# Patient Record
Sex: MALE | Race: WHITE | NOT HISPANIC OR LATINO | URBAN - NONMETROPOLITAN AREA
[De-identification: names, ages, dates, MRNs, and addresses within clinical notes are randomized per-mention and may not be internally consistent; named-entity substitution may affect disease eponyms.]

---

## 2023-05-26 ENCOUNTER — APPOINTMENT (OUTPATIENT)
Dept: PRIMARY CARE | Facility: CLINIC | Age: 23
End: 2023-05-26
Payer: COMMERCIAL

## 2023-05-31 ENCOUNTER — OFFICE VISIT (OUTPATIENT)
Dept: PRIMARY CARE | Facility: CLINIC | Age: 23
End: 2023-05-31
Payer: COMMERCIAL

## 2023-05-31 VITALS
DIASTOLIC BLOOD PRESSURE: 66 MMHG | HEART RATE: 72 BPM | WEIGHT: 136 LBS | BODY MASS INDEX: 20.61 KG/M2 | SYSTOLIC BLOOD PRESSURE: 124 MMHG | OXYGEN SATURATION: 99 % | HEIGHT: 68 IN

## 2023-05-31 DIAGNOSIS — Z51.81 ENCOUNTER FOR MONITORING ANTICONVULSANT THERAPY: ICD-10-CM

## 2023-05-31 DIAGNOSIS — R56.9 POLYMICROGYRIA WITH SEIZURES (MULTI): Primary | ICD-10-CM

## 2023-05-31 DIAGNOSIS — Z79.899 ENCOUNTER FOR MONITORING ANTICONVULSANT THERAPY: ICD-10-CM

## 2023-05-31 DIAGNOSIS — F41.1 GENERALIZED ANXIETY DISORDER: ICD-10-CM

## 2023-05-31 DIAGNOSIS — Q04.3 POLYMICROGYRIA WITH SEIZURES (MULTI): Primary | ICD-10-CM

## 2023-05-31 DIAGNOSIS — F33.1 MDD (MAJOR DEPRESSIVE DISORDER), RECURRENT EPISODE, MODERATE (MULTI): ICD-10-CM

## 2023-05-31 PROBLEM — R07.81 RIB PAIN ON LEFT SIDE: Status: RESOLVED | Noted: 2023-05-31 | Resolved: 2023-05-31

## 2023-05-31 PROBLEM — B35.6 TINEA CRURIS: Status: RESOLVED | Noted: 2023-05-31 | Resolved: 2023-05-31

## 2023-05-31 PROBLEM — R53.81 MALAISE AND FATIGUE: Status: RESOLVED | Noted: 2023-05-31 | Resolved: 2023-05-31

## 2023-05-31 PROBLEM — D72.819 LEUKOPENIA: Status: ACTIVE | Noted: 2023-05-31

## 2023-05-31 PROBLEM — R22.0 HEAD LUMP: Status: RESOLVED | Noted: 2023-05-31 | Resolved: 2023-05-31

## 2023-05-31 PROBLEM — I86.1 LEFT VARICOCELE: Status: ACTIVE | Noted: 2023-05-31

## 2023-05-31 PROBLEM — G40.909 SEIZURE DISORDER (MULTI): Status: RESOLVED | Noted: 2023-05-31 | Resolved: 2023-05-31

## 2023-05-31 PROBLEM — M25.50 POLYARTHRALGIA: Status: RESOLVED | Noted: 2023-05-31 | Resolved: 2023-05-31

## 2023-05-31 PROBLEM — M99.08 SEGMENTAL AND SOMATIC DYSFUNCTION OF RIB CAGE: Status: RESOLVED | Noted: 2023-05-31 | Resolved: 2023-05-31

## 2023-05-31 PROBLEM — R10.13 ACUTE EPIGASTRIC PAIN: Status: RESOLVED | Noted: 2023-05-31 | Resolved: 2023-05-31

## 2023-05-31 PROBLEM — M94.0 COSTOCHONDRITIS: Status: RESOLVED | Noted: 2023-05-31 | Resolved: 2023-05-31

## 2023-05-31 PROBLEM — R63.4 WEIGHT LOSS: Status: RESOLVED | Noted: 2023-05-31 | Resolved: 2023-05-31

## 2023-05-31 PROBLEM — D36.7 DERMOID CYST OF LEFT UPPER EXTREMITY: Status: ACTIVE | Noted: 2023-05-31

## 2023-05-31 PROBLEM — M77.8 RIGHT WRIST TENDINITIS: Status: RESOLVED | Noted: 2023-05-31 | Resolved: 2023-05-31

## 2023-05-31 PROBLEM — D48.5 NEOPLASM OF UNCERTAIN BEHAVIOR OF SKIN OF UPPER ARM: Status: RESOLVED | Noted: 2023-05-31 | Resolved: 2023-05-31

## 2023-05-31 PROBLEM — L72.3 SEBACEOUS CYST: Status: RESOLVED | Noted: 2023-05-31 | Resolved: 2023-05-31

## 2023-05-31 PROBLEM — G40.309 GENERALIZED EPILEPSY (MULTI): Status: ACTIVE | Noted: 2023-05-31

## 2023-05-31 PROBLEM — R53.83 MALAISE AND FATIGUE: Status: RESOLVED | Noted: 2023-05-31 | Resolved: 2023-05-31

## 2023-05-31 PROBLEM — R42 DIZZINESS: Status: RESOLVED | Noted: 2023-05-31 | Resolved: 2023-05-31

## 2023-05-31 PROBLEM — R51.9 HEADACHE: Status: RESOLVED | Noted: 2023-05-31 | Resolved: 2023-05-31

## 2023-05-31 PROBLEM — M95.2 SKULL DEFORMITY: Status: RESOLVED | Noted: 2023-05-31 | Resolved: 2023-05-31

## 2023-05-31 PROBLEM — H53.2 DIPLOPIA: Status: RESOLVED | Noted: 2023-05-31 | Resolved: 2023-05-31

## 2023-05-31 PROBLEM — F84.5 ASPERGER'S DISORDER (HHS-HCC): Status: ACTIVE | Noted: 2023-05-31

## 2023-05-31 LAB
ALANINE AMINOTRANSFERASE (SGPT) (U/L) IN SER/PLAS: 19 U/L (ref 10–52)
ALBUMIN (G/DL) IN SER/PLAS: 4.3 G/DL (ref 3.4–5)
ALKALINE PHOSPHATASE (U/L) IN SER/PLAS: 58 U/L (ref 33–120)
ANION GAP IN SER/PLAS: 13 MMOL/L (ref 10–20)
ASPARTATE AMINOTRANSFERASE (SGOT) (U/L) IN SER/PLAS: 18 U/L (ref 9–39)
BILIRUBIN TOTAL (MG/DL) IN SER/PLAS: 0.5 MG/DL (ref 0–1.2)
CALCIUM (MG/DL) IN SER/PLAS: 9.1 MG/DL (ref 8.6–10.3)
CARBON DIOXIDE, TOTAL (MMOL/L) IN SER/PLAS: 28 MMOL/L (ref 21–32)
CHLORIDE (MMOL/L) IN SER/PLAS: 103 MMOL/L (ref 98–107)
CREATININE (MG/DL) IN SER/PLAS: 0.89 MG/DL (ref 0.5–1.3)
ERYTHROCYTE DISTRIBUTION WIDTH (RATIO) BY AUTOMATED COUNT: 12 % (ref 11.5–14.5)
ERYTHROCYTE MEAN CORPUSCULAR HEMOGLOBIN CONCENTRATION (G/DL) BY AUTOMATED: 31.4 G/DL (ref 32–36)
ERYTHROCYTE MEAN CORPUSCULAR VOLUME (FL) BY AUTOMATED COUNT: 98 FL (ref 80–100)
ERYTHROCYTES (10*6/UL) IN BLOOD BY AUTOMATED COUNT: 4.66 X10E12/L (ref 4.5–5.9)
GFR MALE: >90 ML/MIN/1.73M2
GLUCOSE (MG/DL) IN SER/PLAS: 75 MG/DL (ref 74–99)
HEMATOCRIT (%) IN BLOOD BY AUTOMATED COUNT: 45.8 % (ref 41–52)
HEMOGLOBIN (G/DL) IN BLOOD: 14.4 G/DL (ref 13.5–17.5)
LEUKOCYTES (10*3/UL) IN BLOOD BY AUTOMATED COUNT: 4.7 X10E9/L (ref 4.4–11.3)
PLATELETS (10*3/UL) IN BLOOD AUTOMATED COUNT: 195 X10E9/L (ref 150–450)
POTASSIUM (MMOL/L) IN SER/PLAS: 3.7 MMOL/L (ref 3.5–5.3)
PROTEIN TOTAL: 6.3 G/DL (ref 6.4–8.2)
SODIUM (MMOL/L) IN SER/PLAS: 140 MMOL/L (ref 136–145)
UREA NITROGEN (MG/DL) IN SER/PLAS: 14 MG/DL (ref 6–23)

## 2023-05-31 PROCEDURE — 85027 COMPLETE CBC AUTOMATED: CPT

## 2023-05-31 PROCEDURE — 1036F TOBACCO NON-USER: CPT | Performed by: FAMILY MEDICINE

## 2023-05-31 PROCEDURE — 80053 COMPREHEN METABOLIC PANEL: CPT

## 2023-05-31 PROCEDURE — 99214 OFFICE O/P EST MOD 30 MIN: CPT | Performed by: FAMILY MEDICINE

## 2023-05-31 RX ORDER — SERTRALINE HYDROCHLORIDE 50 MG/1
50 TABLET, FILM COATED ORAL DAILY
Qty: 30 TABLET | Refills: 5 | Status: SHIPPED | OUTPATIENT
Start: 2023-05-31 | End: 2023-06-30 | Stop reason: SINTOL

## 2023-05-31 RX ORDER — OXCARBAZEPINE 600 MG/1
600 TABLET, FILM COATED ORAL 2 TIMES DAILY
COMMUNITY
Start: 2014-12-02 | End: 2024-05-23

## 2023-05-31 NOTE — PROGRESS NOTES
Subjective   Patient ID: Flakito Hamm is a 22 y.o. male who presents for Follow-up (Having some anxiety and anger issues, starting to get worse ).  HPI  Only on trileptal right now    Having a lot of anxiety and anger issues  Fiancee issues has put him into a dark spot  Some SI- no plan  No HI  Some hopeless, worthless  + depressed  Energy level is fine  Appetite- was poor but improving  Sleeping-okay  Concentration not good  Some apathy  No maverick      Current Outpatient Medications:     OXcarbazepine (Trileptal) 600 mg tablet, Take 1 tablet (600 mg) by mouth 2 times a day., Disp: , Rfl:     sertraline (Zoloft) 50 mg tablet, Take 1 tablet (50 mg) by mouth once daily., Disp: 30 tablet, Rfl: 5   History reviewed. No pertinent surgical history.   Past Medical History:   Diagnosis Date    Acute maxillary sinusitis, unspecified 04/28/2016    Acute maxillary sinusitis    Cyanosis     Cyanotic episode    Dizziness 05/31/2023    Head lump 05/31/2023    Headache 05/31/2023    Malaise and fatigue 05/31/2023    Neoplasm of uncertain behavior of skin of upper arm 05/31/2023    Other acute sinusitis 04/19/2019    Other acute sinusitis, recurrence not specified    Other amnesia     Memory difficulties    Other conditions influencing health status     Staring spell    Other specified symptoms and signs involving the circulatory and respiratory systems     Gurgling breath sounds    Personal history of other diseases of the respiratory system 10/26/2017    History of acute sinusitis    Personal history of other specified conditions     History of fatigue    Sebaceous cyst 05/31/2023    Segmental and somatic dysfunction of rib cage 05/31/2023    Seizure disorder (CMS/HCC) 05/31/2023    Skull deformity 05/31/2023    Specific developmental disorder of motor function     Gross motor development delay    Tinea cruris 05/31/2023    Tremor, unspecified     Shaking    Unspecified coma (CMS/HCC)     Unconscious    Unspecified convulsions  "(CMS/HCC) 03/24/2013    Convulsions    Weight loss 05/31/2023     Social History     Tobacco Use    Smoking status: Never    Smokeless tobacco: Never   Vaping Use    Vaping status: Every Day     Substances: Nicotine, Flavoring     Devices: Disposable     Passive vaping exposure: Yes   Substance Use Topics    Drug use: Never      No family history on file.   Review of Systems    Objective   /66   Pulse 72   Ht 1.727 m (5' 8\")   Wt 61.7 kg (136 lb)   SpO2 99%   BMI 20.68 kg/m²    Physical Exam  Vitals and nursing note reviewed.   Constitutional:       Appearance: Normal appearance.   HENT:      Head: Normocephalic and atraumatic.   Cardiovascular:      Rate and Rhythm: Normal rate and regular rhythm.      Pulses: Normal pulses.      Heart sounds: Normal heart sounds.   Pulmonary:      Effort: Pulmonary effort is normal.      Breath sounds: Normal breath sounds.   Musculoskeletal:      Cervical back: Normal range of motion and neck supple.   Skin:     Capillary Refill: Capillary refill takes less than 2 seconds.   Neurological:      Mental Status: He is alert.   Psychiatric:         Mood and Affect: Mood is anxious. Affect is flat.         Behavior: Behavior normal.         Assessment/Plan   Problem List Items Addressed This Visit       Generalized anxiety disorder    Relevant Medications    sertraline (Zoloft) 50 mg tablet    Other Relevant Orders    Follow Up In Primary Care    MDD (major depressive disorder), recurrent episode, moderate (CMS/HCC)    Relevant Medications    sertraline (Zoloft) 50 mg tablet    Other Relevant Orders    Follow Up In Primary Care    Polymicrogyria with seizures (CMS/HCC) - Primary     Other Visit Diagnoses       Encounter for monitoring anticonvulsant therapy        Relevant Orders    CBC (Completed)    Comprehensive Metabolic Panel        MDD/SUNI- continue zoloft, avoid caffeine, consider counseling    Seizure DO- Follow with neurology, miki labs, continue " trileptal    Patient understands and agrees with treatment plan    Joon Lai, DO

## 2023-06-30 ENCOUNTER — OFFICE VISIT (OUTPATIENT)
Dept: PRIMARY CARE | Facility: CLINIC | Age: 23
End: 2023-06-30
Payer: COMMERCIAL

## 2023-06-30 VITALS
HEART RATE: 82 BPM | BODY MASS INDEX: 20.14 KG/M2 | DIASTOLIC BLOOD PRESSURE: 66 MMHG | SYSTOLIC BLOOD PRESSURE: 124 MMHG | HEIGHT: 69 IN | WEIGHT: 136 LBS | OXYGEN SATURATION: 98 %

## 2023-06-30 DIAGNOSIS — F33.1 MDD (MAJOR DEPRESSIVE DISORDER), RECURRENT EPISODE, MODERATE (MULTI): ICD-10-CM

## 2023-06-30 DIAGNOSIS — F84.5 ASPERGER'S DISORDER (HHS-HCC): ICD-10-CM

## 2023-06-30 DIAGNOSIS — F41.1 GENERALIZED ANXIETY DISORDER: ICD-10-CM

## 2023-06-30 PROCEDURE — 99213 OFFICE O/P EST LOW 20 MIN: CPT | Performed by: FAMILY MEDICINE

## 2023-06-30 PROCEDURE — 1036F TOBACCO NON-USER: CPT | Performed by: FAMILY MEDICINE

## 2023-06-30 NOTE — PROGRESS NOTES
Subjective   Patient ID: Flakito Hamm is a 22 y.o. male who presents for Follow-up (Follow up on the Zoloft ).  HPI  Zoloft was working well but got ED- libido dropped    Anxiety and anger was better  No HI/SI  No hopeless, worthless  Not depressed  Energy level is fine  Appetite- improved  Sleeping-okay  Concentration better  No apathy  No maverick    No CP, SOB, palpitations, numbness, weakness, n/v, diarrhea    Current Outpatient Medications:     OXcarbazepine (Trileptal) 600 mg tablet, Take 1 tablet (600 mg) by mouth 2 times a day., Disp: , Rfl:     vortioxetine (Trintellix) 5 mg tablet tablet, Take 1 tablet (5 mg) by mouth once daily., Disp: 30 tablet, Rfl: 3   History reviewed. No pertinent surgical history.   Past Medical History:   Diagnosis Date    Acute maxillary sinusitis, unspecified 04/28/2016    Acute maxillary sinusitis    Cyanosis     Cyanotic episode    Dizziness 05/31/2023    Head lump 05/31/2023    Headache 05/31/2023    Malaise and fatigue 05/31/2023    Neoplasm of uncertain behavior of skin of upper arm 05/31/2023    Other acute sinusitis 04/19/2019    Other acute sinusitis, recurrence not specified    Other amnesia     Memory difficulties    Other conditions influencing health status     Staring spell    Other specified symptoms and signs involving the circulatory and respiratory systems     Gurgling breath sounds    Personal history of other diseases of the respiratory system 10/26/2017    History of acute sinusitis    Personal history of other specified conditions     History of fatigue    Sebaceous cyst 05/31/2023    Segmental and somatic dysfunction of rib cage 05/31/2023    Seizure disorder (CMS/HCC) 05/31/2023    Skull deformity 05/31/2023    Specific developmental disorder of motor function     Gross motor development delay    Tinea cruris 05/31/2023    Tremor, unspecified     Shaking    Unspecified coma (CMS/HCC)     Unconscious    Unspecified convulsions (CMS/HCC) 03/24/2013     "Convulsions    Weight loss 05/31/2023     Social History     Tobacco Use    Smoking status: Never    Smokeless tobacco: Never   Vaping Use    Vaping Use: Every day    Substances: Nicotine, Flavoring    Devices: Disposable   Substance Use Topics    Drug use: Never      No family history on file.   Review of Systems    Objective   /66   Pulse 82   Ht 1.753 m (5' 9\")   Wt 61.7 kg (136 lb)   SpO2 98%   BMI 20.08 kg/m²    Physical Exam    Assessment/Plan   Problem List Items Addressed This Visit       Asperger's disorder    Relevant Medications    vortioxetine (Trintellix) 5 mg tablet tablet    Generalized anxiety disorder    Relevant Medications    vortioxetine (Trintellix) 5 mg tablet tablet    MDD (major depressive disorder), recurrent episode, moderate (CMS/HCC)    Relevant Medications    vortioxetine (Trintellix) 5 mg tablet tablet   Discontinue zoloft due to ED  Try Trintellix  CV exercise  F/U 1 month     Patient understands and agrees with treatment plan    Joon Lai, DO   "

## 2024-05-22 DIAGNOSIS — G40.409 OTHER GENERALIZED EPILEPSY AND EPILEPTIC SYNDROMES, NOT INTRACTABLE, WITHOUT STATUS EPILEPTICUS (MULTI): Primary | ICD-10-CM

## 2024-05-23 RX ORDER — OXCARBAZEPINE 600 MG/1
600 TABLET, FILM COATED ORAL 2 TIMES DAILY
Qty: 60 TABLET | Refills: 0 | Status: SHIPPED | OUTPATIENT
Start: 2024-05-23 | End: 2024-05-29 | Stop reason: SDUPTHER

## 2024-05-29 ENCOUNTER — OFFICE VISIT (OUTPATIENT)
Dept: NEUROLOGY | Facility: CLINIC | Age: 24
End: 2024-05-29
Payer: COMMERCIAL

## 2024-05-29 VITALS
HEIGHT: 69 IN | SYSTOLIC BLOOD PRESSURE: 128 MMHG | DIASTOLIC BLOOD PRESSURE: 62 MMHG | HEART RATE: 54 BPM | BODY MASS INDEX: 19.85 KG/M2 | WEIGHT: 134 LBS

## 2024-05-29 DIAGNOSIS — G40.409 OTHER GENERALIZED EPILEPSY AND EPILEPTIC SYNDROMES, NOT INTRACTABLE, WITHOUT STATUS EPILEPTICUS (MULTI): ICD-10-CM

## 2024-05-29 PROCEDURE — 1036F TOBACCO NON-USER: CPT | Performed by: PSYCHIATRY & NEUROLOGY

## 2024-05-29 PROCEDURE — 99213 OFFICE O/P EST LOW 20 MIN: CPT | Performed by: PSYCHIATRY & NEUROLOGY

## 2024-05-29 RX ORDER — OXCARBAZEPINE 600 MG/1
600 TABLET, FILM COATED ORAL 2 TIMES DAILY
Qty: 60 TABLET | Refills: 11 | Status: SHIPPED | OUTPATIENT
Start: 2024-05-29

## 2024-05-29 ASSESSMENT — ENCOUNTER SYMPTOMS
DYSURIA: 0
DYSPHORIC MOOD: 0
NERVOUS/ANXIOUS: 0
WHEEZING: 0
MYALGIAS: 0
RHINORRHEA: 0
SHORTNESS OF BREATH: 0
FEVER: 0
WOUND: 0
PALPITATIONS: 0
FREQUENCY: 0
SEIZURES: 1
CHILLS: 0
SINUS PRESSURE: 0
NAUSEA: 0
CONSTIPATION: 0
DIARRHEA: 0
LIGHT-HEADEDNESS: 0
DIZZINESS: 0
VOMITING: 0
ARTHRALGIAS: 0
SINUS PAIN: 0
COUGH: 0

## 2024-05-29 NOTE — PATIENT INSTRUCTIONS
We will see you back in 1 year for seizures if everything continues to go well.     [facility]:    [address] [facility]  [address] [place] [id number]  Phone: [phone]

## 2024-05-29 NOTE — PROGRESS NOTES
"Subjective   Patient ID: Flakito Hamm is a 23 y.o. male with past medical history of who presents for Seizures.    Patient here today in follow up for seizures.  No seizures since last visit 1 year ago.  Has been stable on current dosage of Trileptal for past several years and remained seizure-free.  Denies adverse effects from this medication.     Seizures   Pertinent negatives include no chest pain, no cough, no nausea, no vomiting and no diarrhea.       Review of Systems   Constitutional:  Negative for chills and fever.   HENT:  Negative for congestion, rhinorrhea, sinus pressure and sinus pain.    Respiratory:  Negative for cough, shortness of breath and wheezing.    Cardiovascular:  Negative for chest pain and palpitations.   Gastrointestinal:  Negative for constipation, diarrhea, nausea and vomiting.   Genitourinary:  Negative for dysuria, frequency and urgency.   Musculoskeletal:  Negative for arthralgias and myalgias.   Skin:  Negative for pallor, rash and wound.   Neurological:  Positive for seizures. Negative for dizziness, syncope and light-headedness.   Psychiatric/Behavioral:  Negative for dysphoric mood. The patient is not nervous/anxious.        Objective     Visit Vitals  /62 (BP Location: Left arm, Patient Position: Sitting, BP Cuff Size: Adult)   Pulse 54   Ht 1.753 m (5' 9\")   Wt 60.8 kg (134 lb)   BMI 19.79 kg/m²   Smoking Status Never   BSA 1.72 m²         Physical Exam  Constitutional:       Appearance: Normal appearance.   HENT:      Head: Normocephalic and atraumatic.      Right Ear: External ear normal.      Left Ear: External ear normal.      Nose: Nose normal.      Mouth/Throat:      Mouth: Mucous membranes are moist.      Pharynx: Oropharynx is clear.   Cardiovascular:      Pulses: Normal pulses.   Pulmonary:      Effort: Pulmonary effort is normal.   Abdominal:      General: Abdomen is flat.   Neurological:      Mental Status: He is alert and oriented to person, place, and time. "      Cranial Nerves: Cranial nerves 2-12 are intact.      Gait: Gait is intact.   Psychiatric:         Mood and Affect: Mood normal.         Speech: Speech normal.         Behavior: Behavior normal.       Neurologic Exam     Mental Status   Oriented to person, place, and time.   Speech: speech is normal     Cranial Nerves   Cranial nerves II through XII intact.     Motor Exam   Muscle bulk: normal  Overall muscle tone: normal    Gait, Coordination, and Reflexes     Gait  Gait: normal    Tremor   Resting tremor: absent  Intention tremor: absent  Action tremor: absent        Assessment/Plan   Problem List Items Addressed This Visit    None  Visit Diagnoses       Other generalized epilepsy and epileptic syndromes, not intractable, without status epilepticus (Multi)        Relevant Medications    OXcarbazepine (Trileptal) 600 mg tablet    Other Relevant Orders    CBC    Comprehensive Metabolic Panel        Stable on current anti-seizure medication, will continue same.  CBC and CMP to be done for surveillance while on this medication.      Follow up with neurology in 1 year, sooner if any issues.     Patient seen and discussed with attending physician, Dr Joey Nunez, DO PGY3  Family Medicine

## 2024-11-22 ENCOUNTER — OFFICE VISIT (OUTPATIENT)
Dept: DERMATOLOGY | Facility: CLINIC | Age: 24
End: 2024-11-22
Payer: COMMERCIAL

## 2024-11-22 DIAGNOSIS — D18.01 ANGIOMA OF SKIN: ICD-10-CM

## 2024-11-22 DIAGNOSIS — L90.5 SCAR CONDITIONS AND FIBROSIS OF SKIN: Primary | ICD-10-CM

## 2024-11-22 DIAGNOSIS — L57.9 SKIN CHANGES DUE TO CHRONIC EXPOSURE TO NONIONIZING RADIATION: ICD-10-CM

## 2024-11-22 DIAGNOSIS — D22.9 BENIGN NEVUS: ICD-10-CM

## 2024-11-22 DIAGNOSIS — B07.8 COMMON WART: ICD-10-CM

## 2024-11-22 DIAGNOSIS — L81.4 LENTIGO: ICD-10-CM

## 2024-11-22 PROCEDURE — 99213 OFFICE O/P EST LOW 20 MIN: CPT | Performed by: NURSE PRACTITIONER

## 2024-11-22 PROCEDURE — 1036F TOBACCO NON-USER: CPT | Performed by: NURSE PRACTITIONER

## 2024-11-22 NOTE — PROGRESS NOTES
Subjective     Flakito Hamm is a 24 y.o. male who presents for the following: Suspicious Skin Lesion.     Review of Systems:  No other skin or systemic complaints other than what is documented elsewhere in the note.    The following portions of the chart were reviewed this encounter and updated as appropriate:   Tobacco  Allergies  Meds  Problems  Med Hx  Surg Hx         Skin Cancer History  No skin cancer on file.      Specialty Problems    None       Objective   Well appearing patient in no apparent distress; mood and affect are within normal limits.    A full examination was performed including scalp, head, eyes, ears, nose, lips, neck, chest, axillae, abdomen, back, buttocks, bilateral upper extremities, bilateral lower extremities, hands, feet, fingers, toes, fingernails, and toenails. All findings within normal limits unless otherwise noted below.      Assessment/Plan   1. Scar conditions and fibrosis of skin  Left Shoulder - Anterior  Well healed scar at the site(s) of prior treatment with no evidence of recurrence.          The scar is clear, there is no evidence of recurrence.  The present appearance of the scar is not worrisome but it should continue to be observed and testing/treatment may be warranted if change occurs.      Related Procedures  Follow Up In Dermatology - Established Patient    2. Angioma of skin  Mid Back  Scattered cherry-red papule(s).    A cherry hemangioma is a small macule (small, flat, smooth area) or papule (small, solid bump) formed from an overgrowth of tiny blood vessels in the skin. Cherry hemangiomas are characteristically red or purplish in color. They often first appear in middle adulthood and usually increase in number with age. Cherry hemangiomas are noncancerous (benign) and are common in adults.    The present appearance of the lesion is not worrisome but it should continue to be observed and testing/treatment may be warranted if change occurs.    Related  Procedures  Follow Up In Dermatology - Established Patient    3. Benign nevus  Scattered, uniform and benign-appearing, regular brown melanocytic papules and macules.    The present appearance of the lesion is not worrisome but it should continue to be observed and testing/treatment may be warranted if change occurs.    Related Procedures  Follow Up In Dermatology - Established Patient    4. Lentigo  Scattered tan macules in sun-exposed areas.    A solar lentigo (plural, solar lentigines), also known as a sun-induced freckle or senile lentigo, is a dark (hyperpigmented) lesion caused by natural or artificial ultraviolet (UV) light. Solar lentigines may be single or multiple. This type of lentigo is different from a simple lentigo (lentigo simplex) because it is caused by exposure to UV light. Solar lentigines are benign, but they do indicate excessive sun exposure, a risk factor for the development of skin cancer.    To prevent solar lentigines, avoid exposure to sunlight in midday (10 AM to 3 PM), wear sun-protective clothing (tightly woven clothes and hats), and apply sunscreen (SPF 30 UVA and UVB block).    The present appearance of the lesion is not worrisome but it should continue to be observed and testing/treatment may be warranted if change occurs.    Related Procedures  Follow Up In Dermatology - Established Patient    5. Skin changes due to chronic exposure to nonionizing radiation  Actinic changes in the form of freckles, lentigines and hyper/hypopigmentation     ABCDEs of melanoma and atypical moles were discussed with the patient.    Patient was instructed to perform monthly self skin examination.  We recommended that the patient have regular full skin exams given an increased risk of subsequent skin cancers.    The patient was instructed to use sun protective behaviors including use of broad spectrum sunscreens and sun protective clothing to reduce risk of skin cancers.    Warning signs of non-melanoma  skin cancer discussed.    Related Procedures  Follow Up In Dermatology - Established Patient    6. Common wart  Left Tip of 5th Toe  Cluster of verrucous papules    -I reviewed the etiology of warts in detail with the patient. Discussed that this is a viral infection of the skin. Warts are difficult to eradicate as they occur in areas of relative immune incompetent skin. Treatments are aimed at creating local irritation to the skin, in order to activate the body's immune system to resolve the viral infection.   -Treatment options discussed with the family including topical therapies.  -Today elect to do the following:   -Start OTC salicylic acid 40% to involved areas once daily.  Instructions provided for use.     Related Procedures  Follow Up In Dermatology - Established Patient        Return to clinic in 1 year for skin check/follow up or sooner if needed

## 2024-11-22 NOTE — PATIENT INSTRUCTIONS

## 2025-06-24 ENCOUNTER — OFFICE VISIT (OUTPATIENT)
Dept: PRIMARY CARE | Facility: CLINIC | Age: 25
End: 2025-06-24
Payer: COMMERCIAL

## 2025-06-24 VITALS
HEART RATE: 88 BPM | OXYGEN SATURATION: 98 % | HEIGHT: 69 IN | BODY MASS INDEX: 19.4 KG/M2 | TEMPERATURE: 98.2 F | WEIGHT: 131 LBS | SYSTOLIC BLOOD PRESSURE: 126 MMHG | DIASTOLIC BLOOD PRESSURE: 70 MMHG

## 2025-06-24 DIAGNOSIS — F41.1 GENERALIZED ANXIETY DISORDER: ICD-10-CM

## 2025-06-24 DIAGNOSIS — F84.5 ASPERGER'S DISORDER: Primary | ICD-10-CM

## 2025-06-24 DIAGNOSIS — F33.1 MDD (MAJOR DEPRESSIVE DISORDER), RECURRENT EPISODE, MODERATE: ICD-10-CM

## 2025-06-24 DIAGNOSIS — G40.409 OTHER GENERALIZED EPILEPSY AND EPILEPTIC SYNDROMES, NOT INTRACTABLE, WITHOUT STATUS EPILEPTICUS (MULTI): ICD-10-CM

## 2025-06-24 PROCEDURE — 1036F TOBACCO NON-USER: CPT | Performed by: FAMILY MEDICINE

## 2025-06-24 PROCEDURE — 96127 BRIEF EMOTIONAL/BEHAV ASSMT: CPT | Performed by: FAMILY MEDICINE

## 2025-06-24 PROCEDURE — 3008F BODY MASS INDEX DOCD: CPT | Performed by: FAMILY MEDICINE

## 2025-06-24 PROCEDURE — 99204 OFFICE O/P NEW MOD 45 MIN: CPT | Performed by: FAMILY MEDICINE

## 2025-06-24 RX ORDER — CITALOPRAM 10 MG/1
10 TABLET ORAL DAILY
Qty: 30 TABLET | Refills: 3 | Status: SHIPPED | OUTPATIENT
Start: 2025-06-24 | End: 2025-10-22

## 2025-06-24 RX ORDER — OXCARBAZEPINE 600 MG/1
600 TABLET, FILM COATED ORAL 2 TIMES DAILY
Qty: 180 TABLET | Refills: 3 | Status: SHIPPED | OUTPATIENT
Start: 2025-06-24 | End: 2026-06-24

## 2025-06-24 ASSESSMENT — ENCOUNTER SYMPTOMS
MYALGIAS: 0
CONSTIPATION: 0
DIARRHEA: 0
DYSURIA: 0
BLOOD IN STOOL: 0
EYE ITCHING: 0
RHINORRHEA: 0
ACTIVITY CHANGE: 0
WEAKNESS: 0
NUMBNESS: 0
NERVOUS/ANXIOUS: 0
SORE THROAT: 0
FLANK PAIN: 0
SINUS PRESSURE: 0
FEVER: 0
HEMATURIA: 0
DYSPHORIC MOOD: 0
UNEXPECTED WEIGHT CHANGE: 0
ARTHRALGIAS: 0
PALPITATIONS: 0
JOINT SWELLING: 0
NAUSEA: 0
EYE DISCHARGE: 0
CONFUSION: 1
LIGHT-HEADEDNESS: 0
WHEEZING: 0
DIZZINESS: 0
VOMITING: 0
DECREASED CONCENTRATION: 1
HEADACHES: 0
ABDOMINAL PAIN: 0
APPETITE CHANGE: 0
SHORTNESS OF BREATH: 0
SLEEP DISTURBANCE: 0
SEIZURES: 1
COUGH: 0

## 2025-06-24 ASSESSMENT — VISUAL ACUITY
OD_CC: 20/15
OS_CC: 20/10

## 2025-06-24 NOTE — PATIENT INSTRUCTIONS
Referral to mental health   S[facility]   REFERRALS MADE  FOLLOW WITH DR BANKS

## 2025-06-24 NOTE — PROGRESS NOTES
Subjective   Patient ID: Flakito Hamm is a 24 y.o. male who presents for Establish Care (Flakito is here to get establish, he also would like a referral for a therapist also stated he takes Oxcarbazepine and he needs a refill for that pill he try to get a appt with a neurologist for this med refill but his appt is until August. Also stated that his having mental problem like anxiety depression).  [facility]  SEES DR BANKS FOR HIS SEIZURES BUT HE IS OUT OF HIS MEDICATION   REFERRAL TO PSYCHIATRY   Review of Systems   Constitutional:  Negative for activity change, appetite change, fever and unexpected weight change.   HENT:  Negative for congestion, ear pain, postnasal drip, rhinorrhea, sinus pressure and sore throat.    Eyes:  Negative for discharge, itching and visual disturbance.   Respiratory:  Negative for cough, shortness of breath and wheezing.    Cardiovascular:  Negative for chest pain, palpitations and leg swelling.   Gastrointestinal:  Negative for abdominal pain, blood in stool, constipation, diarrhea, nausea and vomiting.   Endocrine: Negative for cold intolerance, heat intolerance and polyuria.   Genitourinary:  Negative for dysuria, flank pain and hematuria.        ED  SENSITIVE TO MEDICATIONS   ZOFT CAUSED ED WORSE   WOULD LIKE A REALLY LOW DOSE ANTIDEPRESSANT -ANXIETY DRUG AND TO SEE MENTAL HEALTH  CALLED [facility] AND HE HAS COMMERCIAL INSURANCE    Musculoskeletal:  Negative for arthralgias, gait problem, joint swelling and myalgias.   Skin:  Negative for rash.   Allergic/Immunologic: Negative for environmental allergies and food allergies.   Neurological:  Positive for seizures. Negative for dizziness, syncope, weakness, light-headedness, numbness and headaches.   Psychiatric/Behavioral:  Positive for behavioral problems, confusion and decreased concentration. Negative for dysphoric mood and sleep disturbance. The patient is not nervous/anxious.         HX ASPBERGER'S AND MDD AND ANXIETY              "5/31/2022     2:09 PM 9/23/2022    10:51 AM 5/15/2023     3:15 PM 5/31/2023     1:28 PM 6/30/2023     1:30 PM 5/29/2024     9:40 AM 6/24/2025     8:22 AM   Vitals   Systolic 132 112 126 124 124 128 126   Diastolic 62 60 82 66 66 62 70   BP Location      Left arm    Heart Rate 97 88 80 72 82 54 88   Temp       36.8 °C (98.2 °F)   Height 1.727 m (5' 8\") 0.203 m (8\") 1.727 m (5' 8\") 1.727 m (5' 8\") 1.753 m (5' 9\") 1.753 m (5' 9\") 1.753 m (5' 9\")   Weight (lb) 138 138.13 134.38 136 136 134 131   BMI 20.98 kg/m2 1517.38 kg/m2 20.43 kg/m2 20.68 kg/m2 20.08 kg/m2 19.79 kg/m2 19.35 kg/m2   BSA (m2) 1.73 m2 0.59 m2 1.71 m2 1.72 m2 1.73 m2 1.72 m2 1.7 m2   Visit Report    Report Report Report Report       Body mass index is 19.35 kg/m².      Objective   Physical Exam  Vitals and nursing note reviewed.   Constitutional:       Appearance: Normal appearance.   HENT:      Head: Normocephalic.   Cardiovascular:      Rate and Rhythm: Normal rate and regular rhythm.      Pulses: Normal pulses.      Heart sounds: Normal heart sounds. No murmur heard.     No friction rub. No gallop.   Pulmonary:      Effort: Pulmonary effort is normal. No respiratory distress.      Breath sounds: Normal breath sounds. No wheezing.   Abdominal:      General: There is no distension.      Tenderness: There is no abdominal tenderness.   Musculoskeletal:         General: No deformity. Normal range of motion.   Skin:     General: Skin is warm and dry.      Capillary Refill: Capillary refill takes less than 2 seconds.   Neurological:      General: No focal deficit present.      Mental Status: He is alert and oriented to person, place, and time.   Psychiatric:      Comments: SUNI IS 17  PHQ IS 20         Assessment/Plan   Problem List Items Addressed This Visit       Asperger's disorder - Primary    Relevant Orders    Referral to Psychiatry    Generalized anxiety disorder    Relevant Orders    Referral to Psychiatry    MDD (major depressive disorder), " recurrent episode, moderate    Relevant Orders    Referral to Psychiatry     Other Visit Diagnoses         Other generalized epilepsy and epileptic syndromes, not intractable, without status epilepticus (Multi)        Relevant Medications    OXcarbazepine (Trileptal) 600 mg tablet    Other Relevant Orders    Referral to Psychiatry               Josie Oconnor DO

## 2025-08-21 ENCOUNTER — APPOINTMENT (OUTPATIENT)
Dept: NEUROLOGY | Facility: CLINIC | Age: 25
End: 2025-08-21
Payer: COMMERCIAL

## 2025-11-24 ENCOUNTER — APPOINTMENT (OUTPATIENT)
Dept: DERMATOLOGY | Facility: CLINIC | Age: 25
End: 2025-11-24
Payer: COMMERCIAL